# Patient Record
Sex: MALE | Race: WHITE | NOT HISPANIC OR LATINO | Employment: OTHER | URBAN - METROPOLITAN AREA
[De-identification: names, ages, dates, MRNs, and addresses within clinical notes are randomized per-mention and may not be internally consistent; named-entity substitution may affect disease eponyms.]

---

## 2024-01-01 ENCOUNTER — HOSPITAL ENCOUNTER (EMERGENCY)
Facility: CLINIC | Age: 89
Discharge: HOME | End: 2024-02-16
Attending: EMERGENCY MEDICINE | Admitting: EMERGENCY MEDICINE
Payer: MEDICARE

## 2024-01-01 ENCOUNTER — APPOINTMENT (OUTPATIENT)
Dept: CT IMAGING | Facility: CLINIC | Age: 89
End: 2024-01-01
Attending: EMERGENCY MEDICINE
Payer: MEDICARE

## 2024-01-01 VITALS
TEMPERATURE: 98 F | WEIGHT: 120 LBS | SYSTOLIC BLOOD PRESSURE: 136 MMHG | BODY MASS INDEX: 15.4 KG/M2 | HEIGHT: 74 IN | OXYGEN SATURATION: 98 % | DIASTOLIC BLOOD PRESSURE: 61 MMHG | RESPIRATION RATE: 13 BRPM | HEART RATE: 56 BPM

## 2024-01-01 DIAGNOSIS — J44.1 COPD WITH ACUTE EXACERBATION (H): ICD-10-CM

## 2024-01-01 DIAGNOSIS — J18.9 PNEUMONIA DUE TO INFECTIOUS ORGANISM, UNSPECIFIED LATERALITY, UNSPECIFIED PART OF LUNG: ICD-10-CM

## 2024-01-01 DIAGNOSIS — I71.40 ABDOMINAL AORTIC ANEURYSM (AAA) WITHOUT RUPTURE, UNSPECIFIED PART (H): ICD-10-CM

## 2024-01-01 LAB
ALBUMIN SERPL BCG-MCNC: 3.9 G/DL (ref 3.5–5.2)
ALP SERPL-CCNC: 77 U/L (ref 40–150)
ALT SERPL W P-5'-P-CCNC: 10 U/L (ref 0–70)
ANION GAP SERPL CALCULATED.3IONS-SCNC: 11 MMOL/L (ref 7–15)
AST SERPL W P-5'-P-CCNC: 16 U/L (ref 0–45)
ATRIAL RATE - MUSE: 60 BPM
BASOPHILS # BLD AUTO: 0.1 10E3/UL (ref 0–0.2)
BASOPHILS NFR BLD AUTO: 1 %
BILIRUB SERPL-MCNC: 0.4 MG/DL
BUN SERPL-MCNC: 24.5 MG/DL (ref 8–23)
CALCIUM SERPL-MCNC: 9.4 MG/DL (ref 8.8–10.2)
CHLORIDE SERPL-SCNC: 99 MMOL/L (ref 98–107)
CREAT SERPL-MCNC: 1.02 MG/DL (ref 0.67–1.17)
DEPRECATED HCO3 PLAS-SCNC: 26 MMOL/L (ref 22–29)
DIASTOLIC BLOOD PRESSURE - MUSE: NORMAL MMHG
EGFRCR SERPLBLD CKD-EPI 2021: 71 ML/MIN/1.73M2
EOSINOPHIL # BLD AUTO: 0.1 10E3/UL (ref 0–0.7)
EOSINOPHIL NFR BLD AUTO: 1 %
ERYTHROCYTE [DISTWIDTH] IN BLOOD BY AUTOMATED COUNT: 15.3 % (ref 10–15)
FLUAV RNA SPEC QL NAA+PROBE: NEGATIVE
FLUBV RNA RESP QL NAA+PROBE: NEGATIVE
GLUCOSE SERPL-MCNC: 130 MG/DL (ref 70–99)
HCT VFR BLD AUTO: 42.3 % (ref 40–53)
HGB BLD-MCNC: 13.7 G/DL (ref 13.3–17.7)
HOLD SPECIMEN: NORMAL
IMM GRANULOCYTES # BLD: 0.2 10E3/UL
IMM GRANULOCYTES NFR BLD: 1 %
INTERPRETATION ECG - MUSE: NORMAL
LYMPHOCYTES # BLD AUTO: 0.7 10E3/UL (ref 0.8–5.3)
LYMPHOCYTES NFR BLD AUTO: 5 %
MCH RBC QN AUTO: 31.1 PG (ref 26.5–33)
MCHC RBC AUTO-ENTMCNC: 32.4 G/DL (ref 31.5–36.5)
MCV RBC AUTO: 96 FL (ref 78–100)
MONOCYTES # BLD AUTO: 0.9 10E3/UL (ref 0–1.3)
MONOCYTES NFR BLD AUTO: 6 %
NEUTROPHILS # BLD AUTO: 13.2 10E3/UL (ref 1.6–8.3)
NEUTROPHILS NFR BLD AUTO: 86 %
NRBC # BLD AUTO: 0 10E3/UL
NRBC BLD AUTO-RTO: 0 /100
P AXIS - MUSE: 99 DEGREES
PLATELET # BLD AUTO: 362 10E3/UL (ref 150–450)
POTASSIUM SERPL-SCNC: 4.6 MMOL/L (ref 3.4–5.3)
PR INTERVAL - MUSE: 210 MS
PROT SERPL-MCNC: 7.3 G/DL (ref 6.4–8.3)
QRS DURATION - MUSE: 134 MS
QT - MUSE: 476 MS
QTC - MUSE: 476 MS
R AXIS - MUSE: -58 DEGREES
RBC # BLD AUTO: 4.4 10E6/UL (ref 4.4–5.9)
RSV RNA SPEC NAA+PROBE: NEGATIVE
SARS-COV-2 RNA RESP QL NAA+PROBE: NEGATIVE
SODIUM SERPL-SCNC: 136 MMOL/L (ref 135–145)
SYSTOLIC BLOOD PRESSURE - MUSE: NORMAL MMHG
T AXIS - MUSE: 104 DEGREES
VENTRICULAR RATE- MUSE: 60 BPM
WBC # BLD AUTO: 15.1 10E3/UL (ref 4–11)

## 2024-01-01 PROCEDURE — 250N000013 HC RX MED GY IP 250 OP 250 PS 637: Performed by: EMERGENCY MEDICINE

## 2024-01-01 PROCEDURE — 80053 COMPREHEN METABOLIC PANEL: CPT | Performed by: EMERGENCY MEDICINE

## 2024-01-01 PROCEDURE — 250N000009 HC RX 250: Performed by: EMERGENCY MEDICINE

## 2024-01-01 PROCEDURE — 250N000012 HC RX MED GY IP 250 OP 636 PS 637: Performed by: EMERGENCY MEDICINE

## 2024-01-01 PROCEDURE — 250N000011 HC RX IP 250 OP 636: Performed by: EMERGENCY MEDICINE

## 2024-01-01 PROCEDURE — 93005 ELECTROCARDIOGRAM TRACING: CPT

## 2024-01-01 PROCEDURE — 85025 COMPLETE CBC W/AUTO DIFF WBC: CPT | Performed by: EMERGENCY MEDICINE

## 2024-01-01 PROCEDURE — 71275 CT ANGIOGRAPHY CHEST: CPT

## 2024-01-01 PROCEDURE — 94640 AIRWAY INHALATION TREATMENT: CPT

## 2024-01-01 PROCEDURE — 87637 SARSCOV2&INF A&B&RSV AMP PRB: CPT | Performed by: EMERGENCY MEDICINE

## 2024-01-01 PROCEDURE — 99285 EMERGENCY DEPT VISIT HI MDM: CPT | Mod: 25

## 2024-01-01 PROCEDURE — 36415 COLL VENOUS BLD VENIPUNCTURE: CPT | Performed by: EMERGENCY MEDICINE

## 2024-01-01 RX ORDER — DOXYCYCLINE 100 MG/1
100 CAPSULE ORAL ONCE
Status: COMPLETED | OUTPATIENT
Start: 2024-01-01 | End: 2024-01-01

## 2024-01-01 RX ORDER — CEFUROXIME AXETIL 500 MG/1
500 TABLET ORAL ONCE
Status: COMPLETED | OUTPATIENT
Start: 2024-01-01 | End: 2024-01-01

## 2024-01-01 RX ORDER — PREDNISONE 20 MG/1
40 TABLET ORAL ONCE
Status: COMPLETED | OUTPATIENT
Start: 2024-01-01 | End: 2024-01-01

## 2024-01-01 RX ORDER — ALBUTEROL SULFATE 0.83 MG/ML
2.5 SOLUTION RESPIRATORY (INHALATION) EVERY 4 HOURS PRN
Qty: 75 ML | Refills: 0 | Status: SHIPPED | OUTPATIENT
Start: 2024-01-01

## 2024-01-01 RX ORDER — IPRATROPIUM BROMIDE AND ALBUTEROL SULFATE 2.5; .5 MG/3ML; MG/3ML
3 SOLUTION RESPIRATORY (INHALATION)
Status: COMPLETED | OUTPATIENT
Start: 2024-01-01 | End: 2024-01-01

## 2024-01-01 RX ORDER — IOPAMIDOL 755 MG/ML
56 INJECTION, SOLUTION INTRAVASCULAR ONCE
Status: COMPLETED | OUTPATIENT
Start: 2024-01-01 | End: 2024-01-01

## 2024-01-01 RX ORDER — CEFUROXIME AXETIL 500 MG/1
500 TABLET ORAL 2 TIMES DAILY
Qty: 10 TABLET | Refills: 0 | Status: SHIPPED | OUTPATIENT
Start: 2024-01-01 | End: 2024-01-01

## 2024-01-01 RX ORDER — PREDNISONE 20 MG/1
TABLET ORAL
Qty: 8 TABLET | Refills: 0 | Status: SHIPPED | OUTPATIENT
Start: 2024-01-01

## 2024-01-01 RX ORDER — DOXYCYCLINE 100 MG/1
100 CAPSULE ORAL 2 TIMES DAILY
Qty: 10 CAPSULE | Refills: 0 | Status: SHIPPED | OUTPATIENT
Start: 2024-01-01 | End: 2024-01-01

## 2024-01-01 RX ADMIN — IPRATROPIUM BROMIDE AND ALBUTEROL SULFATE 3 ML: .5; 3 SOLUTION RESPIRATORY (INHALATION) at 06:57

## 2024-01-01 RX ADMIN — PREDNISONE 40 MG: 20 TABLET ORAL at 06:56

## 2024-01-01 RX ADMIN — CEFUROXIME AXETIL 500 MG: 500 TABLET ORAL at 07:19

## 2024-01-01 RX ADMIN — DOXYCYCLINE HYCLATE 100 MG: 100 CAPSULE ORAL at 06:57

## 2024-01-01 RX ADMIN — SODIUM CHLORIDE 83 ML: 9 INJECTION, SOLUTION INTRAVENOUS at 06:05

## 2024-01-01 RX ADMIN — IOPAMIDOL 56 ML: 755 INJECTION, SOLUTION INTRAVENOUS at 06:04

## 2024-01-01 ASSESSMENT — ACTIVITIES OF DAILY LIVING (ADL)
ADLS_ACUITY_SCORE: 35
ADLS_ACUITY_SCORE: 35

## 2024-02-16 NOTE — ED NOTES
Bed: ED03  Expected date:   Expected time:   Means of arrival:   Comments:  [facility] 88M Spitting red and green stuff

## 2024-02-16 NOTE — ED PROVIDER NOTES
"  History     Chief Complaint:  Cough       HPI   Sina Shaw is a 88 year old male with a history of COPD who presented to the emergency department with cough.  Reports productive cough over the last 2 days that has worsened beyond normal.  There is a small amount of blood in the cough today.  No fever.  Patient reports feeling well.  He denies any shortness of breath.  Denies any vomiting.    Independent Historian:   I spoke to the wife over the phone-wife reports that he was admitted at [facility] in early January 2024 for pneumonia.  He then went to facility for rehab after this.  Patient and wife just moved to assisted living facility 3 days ago.    Medications:    Trelegy  Albuterol  Amiodarone  Metoprolol   Statin    Past Medical History:    COPD  Atrial fibrillation  Hyperlipidemia  Pneumonia    Physical Exam   Patient Vitals for the past 24 hrs:   BP Temp Temp src Pulse Resp SpO2 Height Weight   02/16/24 0630 107/65 -- -- 59 30 95 % -- --   02/16/24 0609 -- -- -- 57 16 95 % -- --   02/16/24 0530 114/49 -- -- 56 16 93 % -- --   02/16/24 0503 121/61 98  F (36.7  C) Oral -- -- 97 % 1.88 m (6' 2\") 54.4 kg (120 lb)   02/16/24 0459 -- 98  F (36.7  C) Oral 63 18 94 % -- --   02/16/24 0457 -- -- -- 65 20 -- -- --        Physical Exam  General: Sitting up in bed  Eyes:  The pupils are equal and round    Conjunctivae and sclerae are normal  ENT:    Atraumatic face  Neck:  Normal range of motion  CV:  Regular rate, regular rhythm     Skin warm and well perfused   Resp:  Non labored breathing on room air    No tachypnea    Productive sounding cough heard    Coarse breath sounds bilaterally  GI:  Abdomen is soft, there is no rigidity    No distension    No rebound tenderness     No abdominal tenderness  MS:  Normal muscular tone  Skin:  No rash or acute skin lesions noted  Neuro:   Awake, alert.      Speech is normal and fluent.    Face is symmetric.     Moves all extremities equally  Psych: Normal " affect.  Appropriate interactions.    Emergency Department Course   ECG    ECG results from 02/16/24   EKG 12-lead, tracing only     Value    Systolic Blood Pressure     Diastolic Blood Pressure     Ventricular Rate 60    Atrial Rate 60    PA Interval 210    QRS Duration 134        QTc 476    P Axis 99    R AXIS -58    T Axis 104    Interpretation ECG      Sinus rhythm with 1st degree A-V block  Left axis deviation  Biventricular hypertrophy with QRS widening and repolarization abnormality  Cannot rule out Septal infarct , age undetermined  Abnormal ECG  No previous ECGs available  Confirmed by GENERATED REPORT, COMPUTER ([id number]),  Brianna Ricci ([id number]) on 2/16/2024 5:10:49 AM       Imaging:  CT Chest Pulmonary Embolism w Contrast   Final Result   IMPRESSION:   1.  No evidence of pulmonary embolism.   2.  Multiple bilateral basilar predominant patchy nodular pulmonary opacities, likely infectious versus less likely neoplastic. Recommend follow-up exam with dedicated chest CT in 3 months to ensure resolution.   3.  Bibasilar, left more than right, coarse interstitial pulmonary opacities with associated traction bronchiectasis, could be due to underlying interstitial lung disease.   4.  Left basilar predominant patchy consolidative pulmonary opacities, could be infectious or atelectatic.   5.  Bilateral emphysematous changes, right worse than left.   6.  Partially visualized aneurysmal dilatation of the abdominal aorta measuring 4.1 cm in diameter.         Laboratory:  Labs Ordered and Resulted from Time of ED Arrival to Time of ED Departure   COMPREHENSIVE METABOLIC PANEL - Abnormal       Result Value    Sodium 136      Potassium 4.6      Carbon Dioxide (CO2) 26      Anion Gap 11      Urea Nitrogen 24.5 (*)     Creatinine 1.02      GFR Estimate 71      Calcium 9.4      Chloride 99      Glucose 130 (*)     Alkaline Phosphatase 77      AST 16      ALT 10      Protein Total 7.3      Albumin 3.9      Bilirubin  Total 0.4     CBC WITH PLATELETS AND DIFFERENTIAL - Abnormal    WBC Count 15.1 (*)     RBC Count 4.40      Hemoglobin 13.7      Hematocrit 42.3      MCV 96      MCH 31.1      MCHC 32.4      RDW 15.3 (*)     Platelet Count 362      % Neutrophils 86      % Lymphocytes 5      % Monocytes 6      % Eosinophils 1      % Basophils 1      % Immature Granulocytes 1      NRBCs per 100 WBC 0      Absolute Neutrophils 13.2 (*)     Absolute Lymphocytes 0.7 (*)     Absolute Monocytes 0.9      Absolute Eosinophils 0.1      Absolute Basophils 0.1      Absolute Immature Granulocytes 0.2      Absolute NRBCs 0.0     INFLUENZA A/B, RSV, & SARS-COV2 PCR - Normal    Influenza A PCR Negative      Influenza B PCR Negative      RSV PCR Negative      SARS CoV2 PCR Negative        Emergency Department Course & Assessments:    Interventions:  Medications   cefuroxime (CEFTIN) tablet 500 mg (has no administration in time range)   ipratropium - albuterol 0.5 mg/2.5 mg/3 mL (DUONEB) neb solution 3 mL (3 mLs Nebulization $Given 2/16/24 0657)   iopamidol (ISOVUE-370) solution 56 mL (56 mLs Intravenous $Given 2/16/24 0604)   Saline Flush (83 mLs Intravenous $Given 2/16/24 0605)   predniSONE (DELTASONE) tablet 40 mg (40 mg Oral $Given 2/16/24 0656)   doxycycline hyclate (VIBRAMYCIN) capsule 100 mg (100 mg Oral $Given 2/16/24 0657)      Disposition:  The patient was discharged.     Impression & Plan      Medical Decision Making:  Sina Shaw is a 88-year-old male present to the emergency department with cough.  Patient's vital signs are reassuring and he is not hypoxic.  He does not appear short of breath.  Does have a mild leukocytosis.  CT chest obtained that shows evidence of possible pneumonia.  Has evidence of emphysema.  Does have a history of COPD.  He apparently was admitted to [facility] in January for pneumonia.  We were unable to obtain records from [facility].  Wife confirms the history of hospitalization. He is not currently  on any antibiotics and has not been for at least 2 weeks.  Will treat for pneumonia again though at this time I do not see a reason for hospitalization with no hypoxia, no increase in work of breathing and does not appear septic.  Given COPD history, will also start on prednisone.  Wife would like a nebulizer machine for home so sent home with this.  They have follow-up with primary care provider in 4 days.  I did tell the wife that he needs a repeat CT chest in 3 months and she understands this.  She says that he does have a history of aneurysm though I cannot see this history so also recommended making sure that primary care provider is aware of this.  He has no symptoms to suggest dissection or ruptured aortic aneurysm.  Reasons to return to the emergency department were discussed with the patient and wife.    Diagnosis:    ICD-10-CM    1. Pneumonia due to infectious organism, unspecified laterality, unspecified part of lung  J18.9 Nebulizer      2. COPD with acute exacerbation (H)  J44.1 Nebulizer      3. Abdominal aortic aneurysm (AAA) without rupture, unspecified part (H24)  I71.40            Discharge Medications:  New Prescriptions    ALBUTEROL (PROVENTIL) (2.5 MG/3ML) 0.083% NEB SOLUTION    Take 1 vial (2.5 mg) by nebulization every 4 hours as needed for shortness of breath or wheezing    CEFUROXIME (CEFTIN) 500 MG TABLET    Take 1 tablet (500 mg) by mouth 2 times daily for 5 days    DOXYCYCLINE HYCLATE (VIBRAMYCIN) 100 MG CAPSULE    Take 1 capsule (100 mg) by mouth 2 times daily for 5 days    PREDNISONE (DELTASONE) 20 MG TABLET    Take two tablets (= 40mg) each day for 4 (four) days      2/16/2024   Nikki Bnenett MD Goertz, Maria Kristine, MD  02/16/24 0706

## 2024-02-16 NOTE — DISCHARGE INSTRUCTIONS
Next dose of prednisone on Saturday morning  Next dose of antibiotics on Friday evening, take till gone  Use nebulizer with albuterol solution as needed for shortness of breath or wheezing  Will need repeat CT chest in 3 months to ensure resolution of findings of pneumonia- follow-up with primary care provider to get this scheduled  There was seen an abdominal aneurysm on the imaging today- follow-up with primary care provider regarding this

## 2024-02-16 NOTE — ED TRIAGE NOTES
"Pt arrives via EMS presenting from care facility. Per EMS, staff report pt has had productive cough with \"1 cup of clear sputum with red dots.\" Hx of COPD. 1 duoneb given PTA.      Triage Assessment (Adult)       Row Name 02/16/24 0500          Triage Assessment    Airway WDL WDL        Respiratory WDL    Respiratory WDL X  Productive cough. SPO2 stable in room air.        Skin Circulation/Temperature WDL    Skin Circulation/Temperature WDL WDL        Cardiac WDL    Cardiac WDL WDL  Denies chest pain        Peripheral/Neurovascular WDL    Peripheral Neurovascular WDL WDL        Cognitive/Neuro/Behavioral WDL    Cognitive/Neuro/Behavioral WDL WDL                     "